# Patient Record
(demographics unavailable — no encounter records)

---

## 2025-07-02 NOTE — HISTORY OF PRESENT ILLNESS
[de-identified] : CALLY KINNEY is a 41 year y/o M with PMH of lipoma, testicular mass who presents as a new patient today to establish care. CC annual physical, got a blood panel done at work recently, has a few questions  #IVF Needs Hep B testing for IVF process - antigen testing  #HM Colonoscopy Last done 10 years ago due to stomach pain Would like to talk to GI about repeating colonoscopy/endoscopy as his paternal grandfather was diagnosed with colon cancer at a young age  #Requests Urology referral Notes some ?calcium in the testicle has been monitored in the past   PMH: HLD, pt reports h/o thunderclap HA x 3 days in a row during sex last year, went to ED had normal CTH, then saw a Neurologist who told him that he needed a head MRI, lipoma which has been growing over many years, got it checked originally would like removed.  PSH: ear surgery Fam Hx: see chart Medications: occasional vit D Allergies: NKDA, maybe dairy sensitivity Social History: partner is a doctor Lives alone, Yaquelin Nunez Works as consultant at [employer] Diet & Exercise: sometimes, plays squash Tobacco use: never Alcohol use: a few glasses per week Drug use: none Sexually active: Y, does not need testing today Mood: good Firearms: N  #Health Maintenance Tdap: not sure, will do today Gardasil: eligible, will start STI screen: not today

## 2025-07-02 NOTE — PHYSICAL EXAM
[No Acute Distress] : no acute distress [Well-Appearing] : well-appearing [Normal Voice/Communication] : normal voice/communication [Coordination Grossly Intact] : coordination grossly intact [Normal Gait] : normal gait [Speech Grossly Normal] : speech grossly normal [Alert and Oriented x3] : oriented to person, place, and time [Normal Mood] : the mood was normal [Soft] : abdomen soft [Non Tender] : non-tender [Non-distended] : non-distended [Normal Bowel Sounds] : normal bowel sounds [Normal] : no posterior cervical lymphadenopathy and no anterior cervical lymphadenopathy [de-identified] : soft 5 cm mass of left medial glute, non-tender

## 2025-07-02 NOTE — ASSESSMENT
[Vaccines Reviewed] : Immunizations reviewed today. Please see immunization details in the vaccine log within the immunization flowsheet.  [FreeTextEntry1] : #Lipoma of Buttock Notes that this was initially monitored but continues to increase in size and is bothersome. Has not had any imaging done, would like to explore options for excision. - Gen Surg referral  #Testicular ?Mass Pt shares h/o of ?calcium deposit of left testicle which he previously followed with Urology for. Would like a new referral for monitoring, no complaints or changes.  #H/o Thunderclap HA assoc with Sexual Activity Pt reports that this occurred x 3 days, went to the ED and had a normal head CT, was seen later by Neurologist who recommended brain MRI which he has not yet done - F/u with Neurology - MRI brain  #HLD We reviewed his outside lab reports with LDL of 111 and mildly elevated TGs. 6 minutes were spent administering an evidence-based ASCVD risk assessment tool showing the patient's risk calculated to be 5 % lifetime, 1.1% ten year, and discussing results with patient including lifestyle modification as well as treatment options including statin medication. Statin is not indicated at this time, will CTM - Continue healthy diet and exercise - Recheck lipids at next annual physical  #HM Pt presents for annual physical today. VSS. We reviewed age-appropriate screenings and general health maintenance. Due for the following vaccines: Tdap, Gardasil Does not need bloodwork or STI screening today - Check Hep B antigen per pt request for his IVF - Tdap today - Gardasil dose #1 today - Vaccine appt for 2nd Gardasil dose 4-8 weeks - GI referral to discuss CRC screening - Derm referral for skin check - Get records - F/u 1 year, sooner if needed

## 2025-07-02 NOTE — HISTORY OF PRESENT ILLNESS
[de-identified] : CALLY KINNEY is a 41 year y/o M with PMH of lipoma, testicular mass who presents as a new patient today to establish care. CC annual physical, got a blood panel done at work recently, has a few questions  #IVF Needs Hep B testing for IVF process - antigen testing  #HM Colonoscopy Last done 10 years ago due to stomach pain Would like to talk to GI about repeating colonoscopy/endoscopy as his paternal grandfather was diagnosed with colon cancer at a young age  #Requests Urology referral Notes some ?calcium in the testicle has been monitored in the past   PMH: HLD, pt reports h/o thunderclap HA x 3 days in a row during sex last year, went to ED had normal CTH, then saw a Neurologist who told him that he needed a head MRI, lipoma which has been growing over many years, got it checked originally would like removed.  PSH: ear surgery Fam Hx: see chart Medications: occasional vit D Allergies: NKDA, maybe dairy sensitivity Social History: partner is a doctor Lives alone, Yaquelin Nunez Works as consultant at [employer] Diet & Exercise: sometimes, plays squash Tobacco use: never Alcohol use: a few glasses per week Drug use: none Sexually active: Y, does not need testing today Mood: good Firearms: N  #Health Maintenance Tdap: not sure, will do today Gardasil: eligible, will start STI screen: not today

## 2025-07-02 NOTE — PHYSICAL EXAM
[No Acute Distress] : no acute distress [Well-Appearing] : well-appearing [Normal Voice/Communication] : normal voice/communication [Coordination Grossly Intact] : coordination grossly intact [Normal Gait] : normal gait [Speech Grossly Normal] : speech grossly normal [Alert and Oriented x3] : oriented to person, place, and time [Normal Mood] : the mood was normal [Soft] : abdomen soft [Non Tender] : non-tender [Non-distended] : non-distended [Normal Bowel Sounds] : normal bowel sounds [Normal] : no posterior cervical lymphadenopathy and no anterior cervical lymphadenopathy [de-identified] : soft 5 cm mass of left medial glute, non-tender yes

## 2025-07-02 NOTE — HEALTH RISK ASSESSMENT
[Yes] : Yes [2 - 3 times a week (3 pts)] : 2 - 3  times a week (3 points) [1 or 2 (0 pts)] : 1 or 2 (0 points) [Never (0 pts)] : Never (0 points) [0] : 2) Feeling down, depressed, or hopeless: Not at all (0) [PHQ-2 Negative - No further assessment needed] : PHQ-2 Negative - No further assessment needed [Patient reported colonoscopy was normal] : Patient reported colonoscopy was normal [HIV test declined] : HIV test declined [Hepatitis C test declined] : Hepatitis C test declined [Never] : Never [NO] : No